# Patient Record
Sex: MALE | Race: BLACK OR AFRICAN AMERICAN | NOT HISPANIC OR LATINO | Employment: OTHER | RURAL
[De-identification: names, ages, dates, MRNs, and addresses within clinical notes are randomized per-mention and may not be internally consistent; named-entity substitution may affect disease eponyms.]

---

## 2021-10-27 ENCOUNTER — HOSPITAL ENCOUNTER (EMERGENCY)
Facility: HOSPITAL | Age: 46
Discharge: HOME | End: 2021-10-27
Payer: MEDICAID

## 2021-10-27 VITALS
HEIGHT: 66 IN | BODY MASS INDEX: 49.02 KG/M2 | SYSTOLIC BLOOD PRESSURE: 145 MMHG | WEIGHT: 305 LBS | TEMPERATURE: 98 F | HEART RATE: 77 BPM | OXYGEN SATURATION: 99 % | DIASTOLIC BLOOD PRESSURE: 95 MMHG | RESPIRATION RATE: 18 BRPM

## 2021-10-27 DIAGNOSIS — S40.811A ARM ABRASION, RIGHT, INITIAL ENCOUNTER: Primary | ICD-10-CM

## 2021-10-27 PROCEDURE — 63600175 PHARM REV CODE 636 W HCPCS: Performed by: NURSE PRACTITIONER

## 2021-10-27 PROCEDURE — 90715 TDAP VACCINE 7 YRS/> IM: CPT | Performed by: NURSE PRACTITIONER

## 2021-10-27 PROCEDURE — 99284 EMERGENCY DEPT VISIT MOD MDM: CPT

## 2021-10-27 PROCEDURE — 90471 IMMUNIZATION ADMIN: CPT | Performed by: NURSE PRACTITIONER

## 2021-10-27 PROCEDURE — 99282 EMERGENCY DEPT VISIT SF MDM: CPT | Mod: ,,, | Performed by: NURSE PRACTITIONER

## 2021-10-27 PROCEDURE — 99282 PR EMERGENCY DEPT VISIT,LEVEL II: ICD-10-PCS | Mod: ,,, | Performed by: NURSE PRACTITIONER

## 2021-10-27 RX ADMIN — TETANUS TOXOID, REDUCED DIPHTHERIA TOXOID AND ACELLULAR PERTUSSIS VACCINE, ADSORBED 0.5 ML: 5; 2.5; 8; 8; 2.5 SUSPENSION INTRAMUSCULAR at 08:10

## 2021-12-23 ENCOUNTER — HOSPITAL ENCOUNTER (EMERGENCY)
Facility: HOSPITAL | Age: 46
Discharge: HOME | End: 2021-12-23
Payer: MEDICAID

## 2021-12-23 VITALS
RESPIRATION RATE: 20 BRPM | DIASTOLIC BLOOD PRESSURE: 109 MMHG | WEIGHT: 300 LBS | SYSTOLIC BLOOD PRESSURE: 180 MMHG | TEMPERATURE: 98 F | OXYGEN SATURATION: 99 % | HEART RATE: 81 BPM | HEIGHT: 66 IN | BODY MASS INDEX: 48.21 KG/M2

## 2021-12-23 DIAGNOSIS — M25.462 KNEE EFFUSION, LEFT: Primary | ICD-10-CM
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
## 2021-12-23 DIAGNOSIS — S89.90XA KNEE INJURY: ICD-10-CM

## 2021-12-23 PROCEDURE — 99282 EMERGENCY DEPT VISIT SF MDM: CPT | Mod: ,,, | Performed by: NURSE PRACTITIONER

## 2021-12-23 PROCEDURE — 99282 PR EMERGENCY DEPT VISIT,LEVEL II: ICD-10-PCS | Mod: ,,, | Performed by: NURSE PRACTITIONER

## 2021-12-23 PROCEDURE — 99283 EMERGENCY DEPT VISIT LOW MDM: CPT

## 2021-12-24 ENCOUNTER — TELEPHONE (OUTPATIENT)
Dept: EMERGENCY MEDICINE | Facility: HOSPITAL | Age: 46
End: 2021-12-24
Payer: MEDICAID

## 2022-01-21 ENCOUNTER — HOSPITAL ENCOUNTER (EMERGENCY)
Facility: HOSPITAL | Age: 47
Discharge: HOME | End: 2022-01-21
Payer: MEDICAID

## 2022-01-21 VITALS
SYSTOLIC BLOOD PRESSURE: 166 MMHG | TEMPERATURE: 98 F | OXYGEN SATURATION: 98 % | RESPIRATION RATE: 20 BRPM | WEIGHT: 300 LBS | HEART RATE: 77 BPM | DIASTOLIC BLOOD PRESSURE: 61 MMHG | BODY MASS INDEX: 48.21 KG/M2 | HEIGHT: 66 IN

## 2022-01-21 DIAGNOSIS — R05.9 COUGH: Primary | ICD-10-CM

## 2022-01-21 PROCEDURE — 99999 HC NO LEVEL OF SERVICE - ED ONLY: CPT

## 2022-01-21 PROCEDURE — 99499 UNLISTED E&M SERVICE: CPT | Mod: ,,, | Performed by: NURSE PRACTITIONER

## 2022-01-21 PROCEDURE — 99499 NO LOS: ICD-10-PCS | Mod: ,,, | Performed by: NURSE PRACTITIONER

## 2022-01-21 NOTE — ED PROVIDER NOTES
Encounter Date: 1/21/2022       History     Chief Complaint   Patient presents with    Cough     Patient reports who congestion and cough x2 weeks.  Afebrile.  Denies dyspnea.  No change in intake or output.        Review of patient's allergies indicates:  No Known Allergies  Past Medical History:   Diagnosis Date    Hypertension      History reviewed. No pertinent surgical history.  History reviewed. No pertinent family history.  Social History     Tobacco Use    Smoking status: Never Smoker    Smokeless tobacco: Never Used   Substance Use Topics    Alcohol use: Never    Drug use: Never     Review of Systems   Constitutional: Negative for fever.   HENT: Positive for congestion. Negative for trouble swallowing.    Eyes: Negative for visual disturbance.   Respiratory: Positive for cough. Negative for shortness of breath.    Cardiovascular: Negative for chest pain, palpitations and leg swelling.   Gastrointestinal: Negative for abdominal pain, diarrhea, nausea and vomiting.   Genitourinary: Negative for decreased urine volume, dysuria, frequency and hematuria.   Skin: Negative for color change.   Neurological: Negative for dizziness, numbness and headaches.       Physical Exam     Initial Vitals [01/21/22 1330]   BP Pulse Resp Temp SpO2   (!) 166/61 77 20 98.3 °F (36.8 °C) 98 %      MAP       --         Physical Exam    Nursing note and vitals reviewed.  Constitutional: No distress.   HENT:   Head: Normocephalic and atraumatic.   Mouth/Throat:       Eyes: EOM are normal.   Neck: Neck supple.   Cardiovascular: Normal rate, regular rhythm and normal heart sounds.   Pulmonary/Chest: Breath sounds normal. No respiratory distress.   Musculoskeletal:      Cervical back: Neck supple.     Neurological: He is alert. GCS score is 15. GCS eye subscore is 4. GCS verbal subscore is 5. GCS motor subscore is 6.   Skin: Skin is warm and dry. Capillary refill takes less than 2 seconds.         Medical Screening Exam    Chief  Complaint HPI is Acute.    Mental State:  Any evidence of altered mental status?  No  General Appearance:  Well appearing?  Yes  Degree of Pain:  Visual analog pain score less than 3?  Yes  Skin:  Evidence of dehydration or poor perfusion?  No    Ambulatory Status:  Ability to ambulate without difficulty?  Yes      ED Course   Procedures  Labs Reviewed - No data to display       Imaging Results    None          Medications - No data to display                    Clinical Impression:   Final diagnoses:  [R05.9] Cough (Primary)          ED Disposition Condition    Discharge Stable        ED Prescriptions     None        Follow-up Information    None          LILLY Hendricks  01/21/22 1400

## 2022-01-22 ENCOUNTER — TELEPHONE (OUTPATIENT)
Dept: EMERGENCY MEDICINE | Facility: HOSPITAL | Age: 47
End: 2022-01-22
Payer: MEDICAID

## 2023-06-01 ENCOUNTER — HOSPITAL ENCOUNTER (EMERGENCY)
Facility: HOSPITAL | Age: 48
Discharge: HOME | End: 2023-06-01
Payer: MEDICAID

## 2023-06-01 VITALS
BODY MASS INDEX: 40.63 KG/M2 | TEMPERATURE: 98 F | WEIGHT: 300 LBS | RESPIRATION RATE: 20 BRPM | SYSTOLIC BLOOD PRESSURE: 166 MMHG | DIASTOLIC BLOOD PRESSURE: 97 MMHG | OXYGEN SATURATION: 97 % | HEART RATE: 72 BPM | HEIGHT: 72 IN

## 2023-06-01 DIAGNOSIS — L08.9 ABRASION OF ANKLE WITH INFECTION, RIGHT, INITIAL ENCOUNTER: ICD-10-CM

## 2023-06-01 DIAGNOSIS — S90.511A ABRASION OF ANKLE WITH INFECTION, RIGHT, INITIAL ENCOUNTER: ICD-10-CM

## 2023-06-01 DIAGNOSIS — G89.29 CHRONIC PAIN OF RIGHT ANKLE: Primary | ICD-10-CM

## 2023-06-01 DIAGNOSIS — M25.571 CHRONIC PAIN OF RIGHT ANKLE: Primary | ICD-10-CM

## 2023-06-01 DIAGNOSIS — M25.571 RIGHT ANKLE PAIN: ICD-10-CM

## 2023-06-01 PROCEDURE — 99284 EMERGENCY DEPT VISIT MOD MDM: CPT | Mod: ,,, | Performed by: NURSE PRACTITIONER

## 2023-06-01 PROCEDURE — 99284 EMERGENCY DEPT VISIT MOD MDM: CPT

## 2023-06-01 PROCEDURE — 99284 PR EMERGENCY DEPT VISIT,LEVEL IV: ICD-10-PCS | Mod: ,,, | Performed by: NURSE PRACTITIONER

## 2023-06-01 PROCEDURE — 63600175 PHARM REV CODE 636 W HCPCS: Performed by: NURSE PRACTITIONER

## 2023-06-01 PROCEDURE — 96372 THER/PROPH/DIAG INJ SC/IM: CPT | Performed by: NURSE PRACTITIONER

## 2023-06-01 RX ORDER — NAPROXEN 500 MG/1
500 TABLET ORAL 2 TIMES DAILY
Qty: 20 TABLET | Refills: 0 | Status: SHIPPED | OUTPATIENT
Start: 2023-06-01

## 2023-06-01 RX ORDER — AMLODIPINE BESYLATE 10 MG/1
10 TABLET ORAL
COMMUNITY
Start: 2023-05-19

## 2023-06-01 RX ORDER — HYDROCHLOROTHIAZIDE 25 MG/1
25 TABLET ORAL
COMMUNITY
Start: 2023-03-13

## 2023-06-01 RX ORDER — CEPHALEXIN 500 MG/1
500 CAPSULE ORAL 4 TIMES DAILY
Qty: 20 CAPSULE | Refills: 0 | Status: SHIPPED | OUTPATIENT
Start: 2023-06-01 | End: 2023-06-06

## 2023-06-01 RX ORDER — KETOROLAC TROMETHAMINE 30 MG/ML
60 INJECTION, SOLUTION INTRAMUSCULAR; INTRAVENOUS
Status: COMPLETED | OUTPATIENT
Start: 2023-06-01 | End: 2023-06-01

## 2023-06-01 RX ORDER — POTASSIUM CHLORIDE 20 MEQ/1
TABLET, EXTENDED RELEASE ORAL
COMMUNITY
Start: 2023-05-19

## 2023-06-01 RX ORDER — LOSARTAN POTASSIUM 100 MG/1
100 TABLET ORAL
COMMUNITY
Start: 2023-03-13

## 2023-06-01 RX ADMIN — KETOROLAC TROMETHAMINE 60 MG: 30 INJECTION, SOLUTION INTRAMUSCULAR; INTRAVENOUS at 03:06

## 2023-06-01 NOTE — DISCHARGE INSTRUCTIONS
Take Rx as directed  Apply triple antibiotic ointment to wound daily  Continue Home Medications  Follow up with pcp as needed

## 2023-06-01 NOTE — ED PROVIDER NOTES
Encounter Date: 6/1/2023       History     Chief Complaint   Patient presents with    Leg Pain     Right    Ankle Pain     Right     Presents to ED with c/o right ankle pain x 2 months with worsening today. Denies injury. Pain worse w/ weight bearing and palpation.     The history is provided by the patient.   Review of patient's allergies indicates:  No Known Allergies  Past Medical History:   Diagnosis Date    Hypertension      History reviewed. No pertinent surgical history.  History reviewed. No pertinent family history.  Social History     Tobacco Use    Smoking status: Never    Smokeless tobacco: Never   Substance Use Topics    Alcohol use: Never    Drug use: Never     Review of Systems   Respiratory:  Negative for chest tightness and shortness of breath.    Cardiovascular:  Negative for chest pain.   Musculoskeletal:  Positive for arthralgias and myalgias.   All other systems reviewed and are negative.    Physical Exam     Initial Vitals [06/01/23 1453]   BP Pulse Resp Temp SpO2   (!) 183/95 77 20 98 °F (36.7 °C) 100 %      MAP       --         Physical Exam    Nursing note and vitals reviewed.  Constitutional: He appears well-developed and well-nourished.   HENT:   Head: Normocephalic and atraumatic.   Nose: Nose normal.   Mouth/Throat: Oropharynx is clear and moist.   Eyes: Conjunctivae and EOM are normal. Pupils are equal, round, and reactive to light.   Neck: Neck supple.   Normal range of motion.  Cardiovascular:  Normal rate, regular rhythm, normal heart sounds and intact distal pulses.           No murmur heard.  Pulmonary/Chest: Breath sounds normal.   Abdominal: Abdomen is soft. Bowel sounds are normal.   Musculoskeletal:         General: Tenderness present. Normal range of motion.      Cervical back: Normal range of motion and neck supple.      Comments: Right Lateral Ankle Pain     Neurological: He is alert and oriented to person, place, and time. He has normal reflexes. GCS score is 15. GCS eye  subscore is 4. GCS verbal subscore is 5. GCS motor subscore is 6.   Skin: Skin is warm and dry. Capillary refill takes less than 2 seconds.   Psychiatric: He has a normal mood and affect. His behavior is normal. Judgment and thought content normal.       Medical Screening Exam   See Full Note    ED Course   Procedures  Labs Reviewed - No data to display       Imaging Results              X-Ray Ankle Complete Right (Final result)  Result time 06/01/23 15:47:25      Final result by Glynn Celeste MD (06/01/23 15:47:25)                   Impression:      No acute bony abnormality      Electronically signed by: Glynn Celeste  Date:    06/01/2023  Time:    15:47               Narrative:    EXAMINATION:  XR ANKLE COMPLETE 3 VIEW RIGHT    CLINICAL HISTORY:  .  Pain in right ankle and joints of right foot    COMPARISON:  August 28, 2017    TECHNIQUE:  AP, lateral, and oblique views right ankle    FINDINGS:  There is no acute fracture or dislocation.  Ankle mortise is well preserved.  There is soft tissue swelling about the ankle                                       Medications   ketorolac injection 60 mg (60 mg Intramuscular Given 6/1/23 5006)     Medical Decision Making:   Clinical Tests:   Radiological Study: Ordered and Reviewed  ED Management:  No acute pathological findings from XR. Will treat w/ NSAIDs.                        Clinical Impression:   Final diagnoses:  [M25.571] Right ankle pain  [M25.571, G89.29] Chronic pain of right ankle (Primary)  [S90.511A, L08.9] Abrasion of ankle with infection, right, initial encounter        ED Disposition Condition    Discharge Stable          ED Prescriptions       Medication Sig Dispense Start Date End Date Auth. Provider    cephALEXin (KEFLEX) 500 MG capsule Take 1 capsule (500 mg total) by mouth 4 (four) times daily. for 5 days 20 capsule 6/1/2023 6/6/2023 Nicko Mendez, NP    naproxen (NAPROSYN) 500 MG tablet Take 1 tablet (500 mg total) by mouth 2 (two)  times daily. 20 tablet 6/1/2023 -- Nicko Mendez NP          Follow-up Information    None          Nicko Mendez NP  06/02/23 0142

## 2025-07-20 ENCOUNTER — HOSPITAL ENCOUNTER (EMERGENCY)
Facility: HOSPITAL | Age: 50
Discharge: HOME | End: 2025-07-20
Payer: MEDICAID

## 2025-07-20 VITALS
WEIGHT: 300 LBS | SYSTOLIC BLOOD PRESSURE: 184 MMHG | OXYGEN SATURATION: 98 % | RESPIRATION RATE: 18 BRPM | TEMPERATURE: 98 F | DIASTOLIC BLOOD PRESSURE: 109 MMHG | HEIGHT: 66 IN | HEART RATE: 75 BPM | BODY MASS INDEX: 48.21 KG/M2

## 2025-07-20 DIAGNOSIS — E87.6 HYPOKALEMIA: ICD-10-CM

## 2025-07-20 DIAGNOSIS — R07.9 CHEST PAIN: Primary | ICD-10-CM

## 2025-07-20 DIAGNOSIS — I10 HYPERTENSION, UNSPECIFIED TYPE: ICD-10-CM

## 2025-07-20 LAB
ALBUMIN SERPL BCP-MCNC: 3.3 G/DL (ref 3.5–5)
ALBUMIN/GLOB SERPL: 0.9 {RATIO}
ALP SERPL-CCNC: 95 U/L (ref 40–150)
ALT SERPL W P-5'-P-CCNC: 22 U/L
AMPHET UR QL SCN: NEGATIVE
ANION GAP SERPL CALCULATED.3IONS-SCNC: 12 MMOL/L (ref 7–16)
AST SERPL W P-5'-P-CCNC: 28 U/L (ref 11–45)
BACTERIA #/AREA URNS HPF: NORMAL /HPF
BARBITURATES UR QL SCN: NEGATIVE
BASOPHILS # BLD AUTO: 0.05 K/UL (ref 0–0.2)
BASOPHILS NFR BLD AUTO: 0.5 % (ref 0–1)
BENZODIAZ METAB UR QL SCN: NEGATIVE
BILIRUB SERPL-MCNC: 0.4 MG/DL
BILIRUB UR QL STRIP: NEGATIVE
BUN SERPL-MCNC: 21 MG/DL (ref 9–21)
BUN/CREAT SERPL: 14 (ref 6–20)
CALCIUM SERPL-MCNC: 9.2 MG/DL (ref 8.4–10.2)
CANNABINOIDS UR QL SCN: NEGATIVE
CHLORIDE SERPL-SCNC: 101 MMOL/L (ref 98–107)
CLARITY UR: CLEAR
CO2 SERPL-SCNC: 30 MMOL/L (ref 22–29)
COCAINE UR QL SCN: NEGATIVE
COLOR UR: YELLOW
CREAT SERPL-MCNC: 1.54 MG/DL (ref 0.72–1.25)
D DIMER PPP FEU-MCNC: <0.27 ΜG/ML (ref 0–0.47)
DIFFERENTIAL METHOD BLD: ABNORMAL
EGFR (NO RACE VARIABLE) (RUSH/TITUS): 55 ML/MIN/1.73M2
EOSINOPHIL # BLD AUTO: 0.24 K/UL (ref 0–0.5)
EOSINOPHIL NFR BLD AUTO: 2.3 % (ref 1–4)
ERYTHROCYTE [DISTWIDTH] IN BLOOD BY AUTOMATED COUNT: 14.7 % (ref 11.5–14.5)
GLOBULIN SER-MCNC: 3.8 G/DL (ref 2–4)
GLUCOSE SERPL-MCNC: 97 MG/DL (ref 74–100)
GLUCOSE UR STRIP-MCNC: NEGATIVE MG/DL
HCT VFR BLD AUTO: 39 % (ref 40–54)
HGB BLD-MCNC: 12 G/DL (ref 13.5–18)
IMM GRANULOCYTES # BLD AUTO: 0.02 K/UL (ref 0–0.04)
IMM GRANULOCYTES NFR BLD: 0.2 % (ref 0–0.4)
KETONES UR STRIP-SCNC: NEGATIVE MG/DL
LEUKOCYTE ESTERASE UR QL STRIP: NEGATIVE
LYMPHOCYTES # BLD AUTO: 3.32 K/UL (ref 1–4.8)
LYMPHOCYTES NFR BLD AUTO: 32.4 % (ref 27–41)
MAGNESIUM SERPL-MCNC: 2 MG/DL (ref 1.6–2.6)
MCH RBC QN AUTO: 24.8 PG (ref 27–31)
MCHC RBC AUTO-ENTMCNC: 30.8 G/DL (ref 32–36)
MCV RBC AUTO: 80.6 FL (ref 80–96)
MONOCYTES # BLD AUTO: 0.78 K/UL (ref 0–0.8)
MONOCYTES NFR BLD AUTO: 7.6 % (ref 2–6)
MPC BLD CALC-MCNC: 10.7 FL (ref 9.4–12.4)
NEUTROPHILS # BLD AUTO: 5.85 K/UL (ref 1.8–7.7)
NEUTROPHILS NFR BLD AUTO: 57 % (ref 53–65)
NITRITE UR QL STRIP: NEGATIVE
NRBC # BLD AUTO: 0 X10E3/UL
NRBC, AUTO (.00): 0 %
NT-PROBNP SERPL-MCNC: 75 PG/ML (ref 1–125)
OPIATES UR QL SCN: NEGATIVE
PCP UR QL SCN: NEGATIVE
PH UR STRIP: 6 PH UNITS
PLATELET # BLD AUTO: 263 K/UL (ref 150–400)
POTASSIUM SERPL-SCNC: 2.8 MMOL/L (ref 3.5–5.1)
PROT SERPL-MCNC: 7.1 G/DL (ref 6.4–8.3)
PROT UR QL STRIP: >=300
RBC # BLD AUTO: 4.84 M/UL (ref 4.6–6.2)
RBC # UR STRIP: ABNORMAL /UL
RBC #/AREA URNS HPF: NORMAL /HPF
SODIUM SERPL-SCNC: 140 MMOL/L (ref 136–145)
SP GR UR STRIP: 1.02
SQUAMOUS #/AREA URNS LPF: NORMAL /LPF
TROPONIN I SERPL HS-MCNC: 15 NG/L
TROPONIN I SERPL HS-MCNC: 15.3 NG/L
UROBILINOGEN UR STRIP-ACNC: 2 MG/DL
WBC # BLD AUTO: 10.26 K/UL (ref 4.5–11)
WBC #/AREA URNS HPF: NORMAL /HPF

## 2025-07-20 PROCEDURE — 85379 FIBRIN DEGRADATION QUANT: CPT | Performed by: NURSE PRACTITIONER

## 2025-07-20 PROCEDURE — 85025 COMPLETE CBC W/AUTO DIFF WBC: CPT | Performed by: NURSE PRACTITIONER

## 2025-07-20 PROCEDURE — 25000003 PHARM REV CODE 250: Performed by: NURSE PRACTITIONER

## 2025-07-20 PROCEDURE — 81003 URINALYSIS AUTO W/O SCOPE: CPT | Performed by: NURSE PRACTITIONER

## 2025-07-20 PROCEDURE — 80307 DRUG TEST PRSMV CHEM ANLYZR: CPT | Performed by: NURSE PRACTITIONER

## 2025-07-20 PROCEDURE — 83880 ASSAY OF NATRIURETIC PEPTIDE: CPT | Performed by: NURSE PRACTITIONER

## 2025-07-20 PROCEDURE — 99285 EMERGENCY DEPT VISIT HI MDM: CPT | Mod: 25

## 2025-07-20 PROCEDURE — 36415 COLL VENOUS BLD VENIPUNCTURE: CPT | Performed by: NURSE PRACTITIONER

## 2025-07-20 PROCEDURE — 93005 ELECTROCARDIOGRAM TRACING: CPT

## 2025-07-20 PROCEDURE — 99285 EMERGENCY DEPT VISIT HI MDM: CPT | Mod: ,,, | Performed by: NURSE PRACTITIONER

## 2025-07-20 PROCEDURE — 83735 ASSAY OF MAGNESIUM: CPT | Performed by: NURSE PRACTITIONER

## 2025-07-20 PROCEDURE — 84484 ASSAY OF TROPONIN QUANT: CPT | Performed by: NURSE PRACTITIONER

## 2025-07-20 PROCEDURE — 80053 COMPREHEN METABOLIC PANEL: CPT | Performed by: NURSE PRACTITIONER

## 2025-07-20 RX ORDER — NAPROXEN SODIUM 220 MG/1
324 TABLET, FILM COATED ORAL
Status: COMPLETED | OUTPATIENT
Start: 2025-07-20 | End: 2025-07-20

## 2025-07-20 RX ORDER — POTASSIUM CHLORIDE 20 MEQ/1
40 TABLET, EXTENDED RELEASE ORAL
Status: COMPLETED | OUTPATIENT
Start: 2025-07-20 | End: 2025-07-20

## 2025-07-20 RX ADMIN — POTASSIUM CHLORIDE 40 MEQ: 1500 TABLET, EXTENDED RELEASE ORAL at 06:07

## 2025-07-20 RX ADMIN — ASPIRIN 324 MG: 81 TABLET, CHEWABLE ORAL at 05:07

## 2025-07-20 RX ADMIN — POTASSIUM CHLORIDE 40 MEQ: 1500 TABLET, EXTENDED RELEASE ORAL at 08:07

## 2025-07-20 NOTE — ED PROVIDER NOTES
"Encounter Date: 7/20/2025       History     Chief Complaint   Patient presents with    Chest Pain     Cp that started this AM     Presented with c/o mid sternal chest pain that started around 0700 today while he was up doing work around the house after eating breakfast. Denies SOB, diaphoresis or n/v accompanying the pain. Reports pain is constant, feels like "pins and needles" and rates it 10/10 pain scale. States has not taken anything for his pain. Reports that pain did worsen when he turned over in the bed but denies any other triggering or alleviating factors. Denies PMH of CAD or AMI. Reports PMH of HTN. Notes that BP was "audi high" at home. Notes that he has taken his BP meds this am. He reports that he came for evaluation after talking with his family member who is a nurse. Reports that father has CAD and coronary stents in place but is unsure of his age at onset or present age. Denies ETOH, tobacco or illicit drug use.      Review of patient's allergies indicates:  No Known Allergies  Past Medical History:   Diagnosis Date    Hypertension      History reviewed. No pertinent surgical history.  No family history on file.  Social History[1]  Review of Systems   Constitutional:  Positive for activity change. Negative for appetite change and fever.   HENT:  Negative for congestion.    Respiratory:  Negative for cough, chest tightness, shortness of breath and wheezing.    Cardiovascular:  Positive for chest pain. Negative for palpitations and leg swelling.   Gastrointestinal:  Negative for abdominal pain, diarrhea, nausea and vomiting.   Genitourinary: Negative.    Musculoskeletal: Negative.  Negative for back pain.   Skin: Negative.  Negative for rash.   Neurological:  Positive for light-headedness (at times). Negative for dizziness and weakness.   Psychiatric/Behavioral: Negative.         Physical Exam     Initial Vitals [07/20/25 1703]   BP Pulse Resp Temp SpO2   (!) 193/100 79 18 98 °F (36.7 °C) 96 %      MAP "       --         Physical Exam    Nursing note and vitals reviewed.  Constitutional: He appears well-developed and well-nourished. No distress.   HENT:   Head: Normocephalic.   Right Ear: External ear normal.   Left Ear: External ear normal.   Nose: Nose normal. Mouth/Throat: Oropharynx is clear and moist.   Eyes: Conjunctivae and EOM are normal.   Neck: Neck supple. No JVD present.   Normal range of motion.  Cardiovascular:  Normal rate, regular rhythm, normal heart sounds and intact distal pulses.           No murmur heard.  Pulmonary/Chest: Breath sounds normal. He has no wheezes. He has no rhonchi. He has no rales. He exhibits tenderness (mid sternal, right and left of sternum).   Abdominal: Abdomen is soft. Bowel sounds are normal. There is no abdominal tenderness.   Musculoskeletal:         General: No edema. Normal range of motion.      Cervical back: Normal range of motion and neck supple.     Neurological: He is alert and oriented to person, place, and time. He has normal strength.   Skin: Skin is warm and dry. Capillary refill takes less than 2 seconds.   Psychiatric: He has a normal mood and affect.         Medical Screening Exam   See Full Note    ED Course   Procedures  Labs Reviewed   URINALYSIS, REFLEX TO URINE CULTURE - Abnormal       Result Value    Color, UA Yellow      Clarity, UA Clear      pH, UA 6.0      Leukocytes, UA Negative      Nitrites, UA Negative      Protein, UA >=300 (*)     Glucose, UA Negative      Ketones, UA Negative      Urobilinogen, UA 2.0 (*)     Bilirubin, UA Negative      Blood, UA Trace-Intact (*)     Specific Gravity, UA 1.025     COMPREHENSIVE METABOLIC PANEL - Abnormal    Sodium 140      Potassium 2.8 (*)     Chloride 101      CO2 30 (*)     Anion Gap 12      Glucose 97      BUN 21      Creatinine 1.54 (*)     BUN/Creatinine Ratio 14      Calcium 9.2      Total Protein 7.1      Albumin 3.3 (*)     Globulin 3.8      A/G Ratio 0.9      Bilirubin, Total 0.4      Alk Phos 95       ALT 22      AST 28      eGFR 55 (*)    CBC WITH DIFFERENTIAL - Abnormal    WBC 10.26      RBC 4.84      Hemoglobin 12.0 (*)     Hematocrit 39.0 (*)     MCV 80.6      MCH 24.8 (*)     MCHC 30.8 (*)     RDW 14.7 (*)     Platelet Count 263      MPV 10.7      Neutrophils % 57.0      Lymphocytes % 32.4      Monocytes % 7.6 (*)     Eosinophils % 2.3      Basophils % 0.5      Immature Granulocytes % 0.2      nRBC, Auto 0.0      Neutrophils, Abs 5.85      Lymphocytes, Absolute 3.32      Monocytes, Absolute 0.78      Eosinophils, Absolute 0.24      Basophils, Absolute 0.05      Immature Granulocytes, Absolute 0.02      nRBC, Absolute 0.00      Diff Type Auto     DRUG SCREEN, URINE (BEAKER) - Normal    Barbiturates, Urine Negative      Benzodiazepine, Urine Negative      Opiates, Urine Negative      Phencyclidine, Urine Negative      Amphetamine, Urine Negative      Cannabinoid, Urine Negative      Cocaine, Urine Negative      Narrative:     This screen includes the following classes of drugs at the listed cut-off:    Benzodiazepines 200 ng/ml  Cocaine metabolite 300 ng/ml  Opiates 2000 ng/ml  Barbiturates 200 ng/ml  Amphetamines 500 ng/ml  Marijuana metabs (THC) 50 ng/ml  Phencyclidine (PCP) 25 ng/ml    This is a screening test. If results do not correlate with clinical presentation, then a confirmatory send out test is advised.   D DIMER, QUANTITATIVE - Normal    D-Dimer <0.27     TROPONIN I - Normal    Troponin I High Sensitivity 15.3     NT-PRO NATRIURETIC PEPTIDE - Normal    ProBNP 75     MAGNESIUM - Normal    Magnesium 2.0     TROPONIN I - Normal    Troponin I High Sensitivity 15.0     URINALYSIS, MICROSCOPIC - Normal    WBC, UA None Seen      RBC, UA 0-3      Bacteria, UA None Seen      Squamous Epithelial Cells, UA Rare     CBC W/ AUTO DIFFERENTIAL    Narrative:     The following orders were created for panel order CBC auto differential.  Procedure                               Abnormality         Status                      ---------                               -----------         ------                     CBC with Differential[[id number]]        Abnormal            Final result                 Please view results for these tests on the individual orders.   EXTRA TUBES    Narrative:     The following orders were created for panel order EXTRA TUBES.  Procedure                               Abnormality         Status                     ---------                               -----------         ------                     Gold Top Hold[[id number]]                                   In process                 Gold Top Hold[[id number]]                                                              Lopez Top Hold[[id number]]                                   In process                 Lopez Top Hold[[id number]]                                                                Please view results for these tests on the individual orders.   GOLD TOP HOLD   GREY TOP HOLD     EKG Readings: (Independently Interpreted)   Initial Reading: No STEMI. Rhythm: Normal Sinus Rhythm. Heart Rate: 79. Ectopy: No Ectopy.   Reviewed at 1700. No previous available for comparison.       Imaging Results              X-Ray Chest AP Portable (Final result)  Result time 07/20/25 18:03:28      Final result by Blake Piña MD (07/20/25 18:03:28)                   Impression:      1. Interstitial findings are accentuated by habitus, early edema is a consideration.  Correlation is needed.      Electronically signed by: Blake Piña MD  Date:    07/20/2025  Time:    18:03               Narrative:    EXAMINATION:  XR CHEST AP PORTABLE    CLINICAL HISTORY:  Chest pain, unspecified    TECHNIQUE:  Single frontal view of the chest was performed.    COMPARISON:  03/10/2019    FINDINGS:  The cardiomediastinal silhouette is not enlarged.  There is no pleural effusion.  The trachea is midline.  The lungs are symmetrically expanded bilaterally with mildly coarse  "interstitial attenuation, accentuated by habitus..  No large focal consolidation seen.  There is no pneumothorax.  The osseous structures are unremarkable.                                       Medications   aspirin chewable tablet 324 mg (324 mg Oral Given 7/20/25 1719)   potassium chloride SA CR tablet 40 mEq (40 mEq Oral Given 7/20/25 1839)   potassium chloride SA CR tablet 40 mEq (40 mEq Oral Given 7/20/25 2029)     Medical Decision Making  Presented with c/o mid sternal chest pain that started around 0700 today while he was up doing inside chores around the house after eating breakfast. Denies heavy lifting. Denies SOB, diaphoresis or n/v accompanying the pain. Reports pain is constant, feels like "pins and needles" and rates it 10/10 pain scale. States has not taken anything for his pain. Reports that pain did worsen when he turned over in the bed but denies any other triggering or alleviating factors. Denies PMH of CAD or AMI. Reports PMH of HTN. Notes that BP was "audi high" at home. Notes that he has taken his BP meds this am. He reports that he came for evaluation after talking with his family member who is a nurse. Reports that father has CAD and coronary stents in place but is unsure of his age at onset or present age. Denies ETOH, tobacco or illicit drug use.    Amount and/or Complexity of Data Reviewed  Labs: ordered. Decision-making details documented in ED Course.     Details: WBC 40563 wit hH&H 13 and 39, plt [id number]. WBC 80631 with H&H 12 and 39, plt 201978, Na 140, K+ 2.8, Mg 2.0, glucose 97, BUN 21, creatinine 1.54, ALT 22, AST 28, BNP 75, d dimer < 0.27, Troponin #1 15.3, #2 15.0. UDS neg. UA shows no evidence of infection.  Radiology: ordered.     Details: CXR shows interstitial findings are accentuated by habitus, early edema is a consideration. (Lungs are clear, O2 sat 98% on RA, no SOB or orthopnea, no peripheral edema, BNP 75)  ECG/medicine tests: ordered. Decision-making details documented " in ED Course.    Risk  OTC drugs.  Prescription drug management.  Risk Details:  mg po given. KCL 80meq po total given in ED. Pt reports pain has subsided. Continues to deny SOB, nausea or diaphoresis. BP is elevated in ED. Pt reports that he is taking 1 BP med but has no idea of what med or dosage. Chart indicates that pt has been prescribed Losartan 100mg po daily, HCTZ 25 mg po daily, and amlodipine 10 mg po daily. Discussed with mother, she says that she gives him his medications and he is taking all of the prescribed BP meds. She did not bring with her either. She does report that pt is always adding more and more salt to his food. She says that he always has a salt shaker at his plate when he eats. Attempted to educate pt regarding diet as well as meds. PT is sable. /109, HR 75, RR 18, O2 sat 98% on RA. Denies chest pain.  Instructed on home care, diet, med use, follow-up and return precautions. Discharged home with detailed written instructions provided.                 ED Course as of 07/20/25 2058   Sun Jul 20, 2025   1752 WBC: 10.26 [DS]   1752 Hemoglobin(!): 12.0 [DS]   1752 Hematocrit(!): 39.0 [DS]   1752 Platelet Count: 263 [DS]   1820 Sodium: 140 [DS]   1820 Potassium(!): 2.8 [DS]   1820 Glucose: 97 [DS]   1820 BUN: 21 [DS]   1820 Creatinine(!): 1.54 [DS]   1820 ALT: 22 [DS]   1820 AST: 28 [DS]   1820 Magnesium : 2.0 [DS]   1820 Troponin I High Sensitivity: 15.3 [DS]   1820 NT-proBNP: 75 [DS]   1835 D-Dimer: <0.27 [DS]   2014 Troponin I High Sensitivity: 15.0 [DS]      ED Course User Index  [DS] Chiquita Waller NP                           Clinical Impression:   Final diagnoses:  [R07.9] Chest pain (Primary)  [I10] Hypertension, unspecified type  [E87.6] Hypokalemia        ED Disposition Condition    Discharge Stable          ED Prescriptions    None       Follow-up Information       Follow up With Specialties Details Why Contact Info    Skye Pryor II, MD Family Medicine Schedule  an appointment as soon as possible for a visit   [address] [facility] [address]  [phone]      [facility] - Emergency Department Emergency Medicine  If symptoms worsen [address] [facility] [address] [phone]               [1]   Social History  Tobacco Use    Smoking status: Never    Smokeless tobacco: Never   Vaping Use    Vaping status: Never Used   Substance Use Topics    Alcohol use: Never    Drug use: Never        Chiquita Waller NP  07/20/25 2050

## 2025-07-21 NOTE — DISCHARGE INSTRUCTIONS
Take your BP medication as prescribed. Since your are only taking 1 BP pill and your chart has 3 different BP pills listed and BP is elevated today, you are most likely not taking enough medication. Follow-up with Dr. Pryor this week to have BP rechecked and to discuss what medications you are suppose to be taking. Maintain a low salt diet. Take Tylenol as needed for pain. Return to the ED for any new or worsening symptoms.